# Patient Record
Sex: FEMALE | Race: WHITE | NOT HISPANIC OR LATINO
[De-identification: names, ages, dates, MRNs, and addresses within clinical notes are randomized per-mention and may not be internally consistent; named-entity substitution may affect disease eponyms.]

---

## 2019-06-20 ENCOUNTER — APPOINTMENT (OUTPATIENT)
Dept: NEUROSURGERY | Facility: CLINIC | Age: 49
End: 2019-06-20
Payer: MEDICARE

## 2019-06-20 DIAGNOSIS — M62.838 OTHER MUSCLE SPASM: ICD-10-CM

## 2019-06-20 DIAGNOSIS — M41.50 OTHER SECONDARY SCOLIOSIS, SITE UNSPECIFIED: ICD-10-CM

## 2019-06-20 PROCEDURE — 99204 OFFICE O/P NEW MOD 45 MIN: CPT

## 2019-06-20 RX ORDER — CYCLOBENZAPRINE HYDROCHLORIDE 10 MG/1
10 TABLET, FILM COATED ORAL 3 TIMES DAILY
Qty: 90 | Refills: 0 | Status: ACTIVE | COMMUNITY
Start: 2019-06-20 | End: 1900-01-01

## 2019-06-20 NOTE — CONSULT LETTER
[Dear  ___] : Dear  [unfilled], [Consult Letter:] : I had the pleasure of evaluating your patient, [unfilled]. [Consult Closing:] : Thank you very much for allowing me to participate in the care of this patient.  If you have any questions, please do not hesitate to contact me. [FreeTextEntry1] : Ms. Lemus is a very pleasant 49 year-old patient who was seen in the office today in regards to migrating whole-body pain and to review her lumbar and cervical spine imaging.\par \par The patient has a complicated pain history and is being followed by a neurologist, a rheumatologist, and a pain management doctor. The patient's main complaint at this visit is the sensation of "locking" occurring at random times and in random parts of her body. The patient specifically states that her right forearm, shoulder, and back appear to be more affected. However, these "locking” episodes also occur in her legs bilaterally with her right worse than her left. The patient also complains of random numbness and tingling that can affect any part of her body. Currently, the patient has been given a muscle relaxant chlorzoxazone and clonazepam. The patient has previously tried steroids, Zanaflex, Lyrica, and Cymbalta without relief. The patient attempted bedrest, exercise therapy, physical therapy, and a TENS machine without benefit. The patient states that, when present, the pain is rated at more than 10/10 in severity.\par \par The patient has allergies to Toradol and "topical agents". The patient's past medical history includes hypotension, fibromyalgia, depression, anxiety, and premature ventricular contractions.\par \par On examination, the patient is alert, oriented, and compliant with the exam. The patient demonstrates 5/5 strength in the lower extremities bilaterally. The patient ambulates with an antalgic gait. The patient's reflexes are muted and rated at 1+ throughout. There is no evidence of ankle clonus, España sign, or hyperreflexia.\par \par The patient's MRI scan of the cervical spine and lumbar spine was performed on May 21, 2019. The cervical spine MRI scan demonstrates mild multilevel degenerative changes with disc bulges without compression of the spinal cord or nerve roots. The patient's MRI scan of the lumbar spine demonstrates similar degenerative changes with significant disc height loss at L3/4 and L4/5. There is no overt compression of the cauda equina or nerve roots.\par \par Taken together, the patient's clinical history is most consistent with severe muscle spasms. I have explained to the patient that, although there is evidence of mild degenerative changes in her neck and lower back, this is not the cause of her "locking". I did explain, however, that these degenerative changes may contribute to neck and low back pain. I took the time to explain to the patient that, exercise will not worsen her condition in any way, and would conceivably make things better. I provided a prescription for cyclobenzaprine as she is currently out of her muscle relaxants and encouraged her to continue stretching and exercising. At this time, we do not have any regularly scheduled followup with the patient, but we would be happy to see her back in our office should the need arise. [FreeTextEntry3] : Guillaume Du MD, PhD, FRCSC, FAANS\par \par Attending Neurosurgeon\par [facility] [place]\par  of Neurosurgery\par [facility] \[facility]\""/[facility]\par \par [address] \[place] [address]\par \par Office: [phone]\par Fax: [phone]\par Email: [email or web address] \par

## 2019-07-27 ENCOUNTER — TRANSCRIPTION ENCOUNTER (OUTPATIENT)
Age: 49
End: 2019-07-27

## 2019-09-02 ENCOUNTER — TRANSCRIPTION ENCOUNTER (OUTPATIENT)
Age: 49
End: 2019-09-02

## 2019-11-26 ENCOUNTER — TRANSCRIPTION ENCOUNTER (OUTPATIENT)
Age: 49
End: 2019-11-26

## 2022-10-18 ENCOUNTER — APPOINTMENT (OUTPATIENT)
Dept: NEUROSURGERY | Facility: CLINIC | Age: 52
End: 2022-10-18

## 2022-10-18 VITALS
HEART RATE: 123 BPM | HEIGHT: 70 IN | DIASTOLIC BLOOD PRESSURE: 88 MMHG | OXYGEN SATURATION: 98 % | TEMPERATURE: 97.3 F | SYSTOLIC BLOOD PRESSURE: 129 MMHG

## 2022-10-18 DIAGNOSIS — D32.0 BENIGN NEOPLASM OF CEREBRAL MENINGES: ICD-10-CM

## 2022-10-18 PROCEDURE — 99205 OFFICE O/P NEW HI 60 MIN: CPT

## 2022-10-25 NOTE — PHYSICAL EXAM
[General Appearance - Alert] : alert [General Appearance - In No Acute Distress] : in no acute distress [Oriented To Time, Place, And Person] : oriented to person, place, and time [Impaired Insight] : insight and judgment were intact [Affect] : the affect was normal [Person] : oriented to person [Place] : oriented to place [Time] : oriented to time [Short Term Intact] : short term memory intact [Remote Intact] : remote memory intact [Span Intact] : the attention span was normal [Concentration Intact] : normal concentrating ability [Fluency] : fluency intact [Comprehension] : comprehension intact [Current Events] : adequate knowledge of current events [Past History] : adequate knowledge of personal past history [Vocabulary] : adequate range of vocabulary [Cranial Nerves Oculomotor (III)] : extraocular motion intact [Cranial Nerves Facial (VII)] : face symmetrical [Cranial Nerves Trigeminal (V)] : facial sensation intact symmetrically [Cranial Nerves Vestibulocochlear (VIII)] : hearing was intact bilaterally [Cranial Nerves Glossopharyngeal (IX)] : tongue and palate midline [Cranial Nerves Accessory (XI - Cranial And Spinal)] : head turning and shoulder shrug symmetric [Cranial Nerves Hypoglossal (XII)] : there was no tongue deviation with protrusion [Motor Tone] : muscle tone was normal in all four extremities [Motor Strength] : muscle strength was normal in all four extremities [No Muscle Atrophy] : normal bulk in all four extremities [Motor Handedness Right-Handed] : the patient is right hand dominant [Sensation Tactile Decrease] : light touch was intact [Balance] : balance was intact [Extraocular Movements] : extraocular movements were intact [Outer Ear] : the ears and nose were normal in appearance [Neck Appearance] : the appearance of the neck was normal [] : no respiratory distress [Respiration, Rhythm And Depth] : normal respiratory rhythm and effort [Exaggerated Use Of Accessory Muscles For Inspiration] : no accessory muscle use [Heart Rate And Rhythm] : heart rate was normal and rhythm regular [Abnormal Walk] : normal gait [Involuntary Movements] : no involuntary movements were seen [Skin Color & Pigmentation] : normal skin color and pigmentation [Skin Turgor] : normal skin turgor [Past-pointing] : there was no past-pointing [Tremor] : no tremor present

## 2022-10-25 NOTE — HISTORY OF PRESENT ILLNESS
[FreeTextEntry1] : increasing size of meningioma [de-identified] : Ms. Yulia Lemus is a 52 year old right handed female who presents for a neurosurgical consultation for a right frontal meningioma. In 2010, patient had c/o headaches and obtained a brain MRI. \par MRI non contrast 2019 showed a small stable right frontal meningioma. Patient had a fall incident this summer where she suffered a concussion.MRI brain non contrast 7/2022 shows an enlarging right frontal meningioma with mild mass effect. \par She has complaints of headaches and follows with Dr. Schoenberg. Due to the increase in size of tumor in 3 years, we feel this mass should be resected.\par \par Plan: MRI w wo brain lab protocol if patient decides to move forward with surgery\par right craniotomy for frontal meningioma resection\par PST\par COVID\par medical clearance\par STOP ASPIRIN 10 DAYS BEFORE SURGERY

## 2022-10-25 NOTE — ASSESSMENT
[FreeTextEntry1] : 2022\par \par Laura Schoenberg, MD\par  [address]\[place] [address]\par \par Re:	Yluia Lemus\par :	1970\par 		\par Dear Dr. Schoenberg:\par \par Thank you for sending me Yulia Lemus in neurosurgical consultation.  Yulia is a 52-year-old right-handed female who was diagnosed with a meningioma in the right frontal region secondary to scans after headache in .  It has been managed conservatively.  I saw the images in  when the lesion, which appears to be a meningioma, was 8 mm, and current imaging performed this past summer on 2022, shows significant increase in size of this lesion.  It is now 3.4 x 2.0 x 1.8 cm with mild mass effect on the adjacent frontal lobe.  The new picture was taken since the patient fell this summer.  She claims she had a concussion, and the current scan, of note, was done without gadolinium, but it appears to be a convexity-based meningioma.\par \par Her past medical history is positive for hypercholesterolemia.  She had a gallbladder resection in .  She is allergic to all penicillins and Compazine.  She does not smoke.  She is a retired teacher, and of note, she has no relatives whatsoever in the region.  Family history is negative.  She has headache daily and complains of nausea, vomiting, and anxiety.  She is on an aspirin 81 mg and a statin and vitamin D.  She has a completely negative neurologic exam.\par \par I went over the imaging carefully, and there has been a marked increase in size in this lesion that appears to be a right frontal meningioma.  Given her good health and her young age and the rapid growth of this lesion, there is, in my mind, a 100% indication that it be removed through a microsurgical right craniotomy.  The patient asked many questions and is very anxious about the procedure and frankly has been confused about some of the information that she has read on the internet.  I went over every step of the decision-making process, the surgery, and the recovery, inclusive of getting her vising nurses at home since there is no family.  Of note also, the last image that I saw that showed the marked increase of the tumor was from 3 months ago, which represents almost 10% of the interval between 2019 and current, so I assume the tumor is even bigger now.  She certainly should move forward, and prior to this she would need an MRI with and without gadolinium with Brainlab sequence (for our navigation in the OR), medical clearance, and presurgical testing.  I explained to Yulia that this is not an emergency, but it really should be done sooner than later.  Leaving it alone would be contraindicated in my opinion.  I told her I would speak to you, so I have put in a call to your office, and we will discuss the case.  Hopefully she will decide to move forward with the surgery.  All indications, risks, and possible complications were reviewed.\par \par I thank you, Jacqueline, for your confidence in my abilities and for your support over the years.\par \par Thank you very much,\par \par \par \par Ganga Ashraf M.D., F.A.C.S.\par [facility]\par \par \par